# Patient Record
Sex: MALE | Race: ASIAN | NOT HISPANIC OR LATINO | URBAN - METROPOLITAN AREA
[De-identification: names, ages, dates, MRNs, and addresses within clinical notes are randomized per-mention and may not be internally consistent; named-entity substitution may affect disease eponyms.]

---

## 2017-06-26 ENCOUNTER — EMERGENCY (EMERGENCY)
Facility: HOSPITAL | Age: 22
LOS: 1 days | Discharge: HOME | End: 2017-06-26
Attending: EMERGENCY MEDICINE | Admitting: EMERGENCY MEDICINE
Payer: COMMERCIAL

## 2017-06-26 VITALS
RESPIRATION RATE: 14 BRPM | OXYGEN SATURATION: 100 % | DIASTOLIC BLOOD PRESSURE: 49 MMHG | TEMPERATURE: 98 F | HEART RATE: 78 BPM | SYSTOLIC BLOOD PRESSURE: 115 MMHG

## 2017-06-26 PROCEDURE — 99284 EMERGENCY DEPT VISIT MOD MDM: CPT

## 2017-06-26 RX ORDER — IBUPROFEN 200 MG
600 TABLET ORAL ONCE
Qty: 0 | Refills: 0 | Status: COMPLETED | OUTPATIENT
Start: 2017-06-26 | End: 2017-06-26

## 2017-06-26 RX ADMIN — Medication 600 MILLIGRAM(S): at 12:26

## 2017-06-26 NOTE — ED PROVIDER NOTE - ATTENDING CONTRIBUTION TO CARE
I performed the initial face to face bedside interview with this patient regarding history of present illness, review of symptoms and past medical, social and family history.  I completed an independent physical examination.  I was the initial provider who evaluated this patient.  The history, review of symptoms and examination was documented by the scribe in my presence and I attest to the accuracy of the documentation.  I have signed out the follow up of any pending tests (i.e. labs, radiological studies) to the PA.  I have discussed the patient’s plan of care and disposition with the PA. LEE ANN Moura MD
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
## 2017-06-26 NOTE — ED PROVIDER NOTE - CHPI ED SYMPTOMS NEG
no loss of consciousness/no back pain/no  neck pain, no numbness, no tingling, no nausea, no vomiting/no headache

## 2017-06-26 NOTE — ED PROVIDER NOTE - PLAN OF CARE
Follow up with your PMD within 48-72 hours.  Rest, Take Tylenol 650mg every 4-6 hours as needed for pain . You may have a headache associated with nausea in the next few hours/days. This is called a concussion and does not warrant return to the ED unless you develop significant worsening of pain, profuse vomiting, dizziness, changes in vision, difficulty walking/speaking,  weakness or numbness to your extremities.

## 2021-09-05 ENCOUNTER — EMERGENCY (EMERGENCY)
Facility: HOSPITAL | Age: 26
LOS: 1 days | Discharge: HOME | End: 2021-09-05
Attending: EMERGENCY MEDICINE | Admitting: EMERGENCY MEDICINE
Payer: MEDICAID

## 2021-09-05 VITALS
SYSTOLIC BLOOD PRESSURE: 121 MMHG | HEART RATE: 80 BPM | OXYGEN SATURATION: 100 % | TEMPERATURE: 98 F | RESPIRATION RATE: 16 BRPM | DIASTOLIC BLOOD PRESSURE: 56 MMHG

## 2021-09-05 PROCEDURE — 99284 EMERGENCY DEPT VISIT MOD MDM: CPT

## 2021-09-05 RX ORDER — CYCLOBENZAPRINE HYDROCHLORIDE 10 MG/1
5 TABLET, FILM COATED ORAL ONCE
Refills: 0 | Status: COMPLETED | OUTPATIENT
Start: 2021-09-05 | End: 2021-09-05

## 2021-09-05 RX ORDER — IBUPROFEN 200 MG
600 TABLET ORAL ONCE
Refills: 0 | Status: COMPLETED | OUTPATIENT
Start: 2021-09-05 | End: 2021-09-05

## 2021-09-05 RX ORDER — IBUPROFEN 200 MG
1 TABLET ORAL
Qty: 42 | Refills: 0
Start: 2021-09-05 | End: 2021-09-18

## 2021-09-05 RX ORDER — CYCLOBENZAPRINE HYDROCHLORIDE 10 MG/1
1 TABLET, FILM COATED ORAL
Qty: 4 | Refills: 0
Start: 2021-09-05

## 2021-09-05 RX ADMIN — Medication 600 MILLIGRAM(S): at 14:35

## 2021-09-05 RX ADMIN — CYCLOBENZAPRINE HYDROCHLORIDE 5 MILLIGRAM(S): 10 TABLET, FILM COATED ORAL at 14:35

## 2021-09-05 NOTE — ED PROVIDER NOTE - OBJECTIVE STATEMENT
25M with PMH of chronic R LBP that has never been worked up who comes in with hours of worsened R LBP after one deep cough at home today.  He denies F/C/additional coughing, other URI sx.  He denies numbness, weakness, saddle anesthesia, urinary retention or incontinence.  Ambulatory.

## 2021-09-05 NOTE — ED PROVIDER NOTE - PATIENT PORTAL LINK FT
You can access the FollowMyHealth Patient Portal offered by [facility] by registering at the following website: [email or web address]. By joining [employer]’s FollowMyHealth portal, you will also be able to view your health information using other applications (apps) compatible with our system.

## 2021-09-05 NOTE — ED PROVIDER NOTE - CLINICAL SUMMARY MEDICAL DECISION MAKING FREE TEXT BOX
25M with PMH of chronic R LBP that has never been worked up who comes in with hours of worsened R LBP after one deep cough at home today.  He denies F/C/additional coughing, other URI sx.  He denies numbness, weakness, saddle anesthesia, urinary retention or incontinence.  Ambulatory.  Exam remarkable only for + R SLR.  No red flags for cauda equina or cord compression.  Likely sciatica.  Will treat with motrin, flexeril and will refer to spine for MRI and further treatment.

## 2021-09-05 NOTE — ED PROVIDER NOTE - NSFOLLOWUPINSTRUCTIONS_ED_ALL_ED_FT
You have been seen for low back pain.  This is likely sciatica.  Please take motrin 3 times daily and flexeril twice daily as needed for pain.  Come back to the ED immediately if you develop numbness, weakness, or trouble urinating, or if you have other concerns.  Please also follow up with the spine doctor.  The contact information has been provided.

## 2021-09-05 NOTE — ED ADULT NURSE NOTE - OBJECTIVE STATEMENT
pt received to intake 10a, pt c/o lower back pain after sneezing hard today.  pt appears in NAD, lala monotor

## 2021-09-05 NOTE — ED PROVIDER NOTE - CARE PLAN
1 Principal Discharge DX:	Lower back pain  Assessment and plan of treatment:	25M with PMH of chronic R LBP that has never been worked up who comes in with hours of worsened R LBP after one deep cough at home today.  He denies F/C/additional coughing, other URI sx.  He denies numbness, weakness, saddle anesthesia, urinary retention or incontinence.  Ambulatory.  Exam remarkable only for + R SLR.  No red flags for cauda equina or cord compression.  Likely sciatica.  Will treat with motrin, flexeril and will refer to spine for MRI and further treatment.

## 2021-09-05 NOTE — ED ADULT TRIAGE NOTE - CHIEF COMPLAINT QUOTE
lower back pains x past hrs. denies injury, pain started after coughing today. denies problems urinating

## 2021-09-05 NOTE — ED PROVIDER NOTE - PHYSICAL EXAMINATION
HDS, NAD, MMM, eyes clear, lungs CTAB, heart sounds normal, abd soft, NT, ND, no CVAT, LEs without edema, wwp, skin normal temperature and color, cspine NTTP in the midline with FROM, rest of spine NTTP in the midline, no paraspinal tenderness, no stepoffs, neuro: alert and oriented, SILT, no perineal numbness, 4/5 strength in BLE 2/2 pain, + straight leg raise on R

## 2021-09-05 NOTE — ED PROVIDER NOTE - PROGRESS NOTE DETAILS
Pt is now ambulating well and reports decreased LBP.  Will discharge home with motrin and flexeril and will refer to neurosurgery spine.

## 2021-09-07 PROBLEM — M54.5 LOW BACK PAIN: Chronic | Status: ACTIVE | Noted: 2021-09-05

## 2021-09-08 RX ORDER — IBUPROFEN 200 MG
1 TABLET ORAL
Qty: 42 | Refills: 0
Start: 2021-09-08 | End: 2021-09-21

## 2021-09-08 RX ORDER — CYCLOBENZAPRINE HYDROCHLORIDE 10 MG/1
1 TABLET, FILM COATED ORAL
Qty: 4 | Refills: 0
Start: 2021-09-08
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
## 2021-11-10 PROBLEM — Z00.00 ENCOUNTER FOR PREVENTIVE HEALTH EXAMINATION: Status: ACTIVE | Noted: 2021-11-10

## 2021-11-18 ENCOUNTER — APPOINTMENT (OUTPATIENT)
Dept: NEUROLOGY | Facility: CLINIC | Age: 26
End: 2021-11-18
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
